# Patient Record
(demographics unavailable — no encounter records)

---

## 2024-12-20 NOTE — HISTORY OF PRESENT ILLNESS
[de-identified] : Fever, sore throat [FreeTextEntry6] : Fever 103 2 days ago no fever since then sore throat congested coughing maybe wheezing

## 2024-12-20 NOTE — DISCUSSION/SUMMARY
[FreeTextEntry1] : 6 yo with fever x 1 days (resolved), sore throat, cough congestion Rapid strep neg will send culture Rapid flu neg likely viral syndrome Continue supportive care. Call back if new or worsening symptoms, warning signs discussed.

## 2024-12-20 NOTE — PHYSICAL EXAM
[Erythematous Oropharynx] : erythematous oropharynx [NL] : warm, clear [FreeTextEntry7] : no wheezing, good air mvmt